# Patient Record
Sex: MALE | Race: WHITE | NOT HISPANIC OR LATINO | RURAL
[De-identification: names, ages, dates, MRNs, and addresses within clinical notes are randomized per-mention and may not be internally consistent; named-entity substitution may affect disease eponyms.]

---

## 2017-06-27 ENCOUNTER — CONVERSION ENCOUNTER (OUTPATIENT)
Dept: FAMILY MEDICINE CLINIC | Facility: CLINIC | Age: 32
End: 2017-06-27

## 2017-06-27 LAB
ALBUMIN SERPL-MCNC: 4.5 G/DL (ref 3.6–5.1)
ALBUMIN/GLOB SERPL: ABNORMAL {RATIO} (ref 1–2.5)
ALP SERPL-CCNC: 119 UNITS/L (ref 40–115)
ALT SERPL-CCNC: 18 UNITS/L (ref 9–46)
AST SERPL-CCNC: 20 UNITS/L (ref 10–40)
BASOPHILS # BLD AUTO: ABNORMAL 10*3/MM3 (ref 0–200)
BASOPHILS NFR BLD AUTO: 0.4 %
BILIRUB SERPL-MCNC: 0.6 MG/DL (ref 0.2–1.2)
BUN SERPL-MCNC: 15 MG/DL (ref 7–25)
BUN/CREAT SERPL: ABNORMAL (ref 6–22)
CALCIUM SERPL-MCNC: 9.9 MG/DL (ref 8.6–10.3)
CHLORIDE SERPL-SCNC: 105 MMOL/L (ref 98–110)
CO2 CONTENT VENOUS: 28 MMOL/L (ref 20–31)
CONV NEUTROPHILS/100 LEUKOCYTES IN BODY FLUID BY MANUAL COUNT: 78.2 %
CONV TOTAL PROTEIN: 7.1 G/DL (ref 6.1–8.1)
CREAT UR-MCNC: 1 MG/DL (ref 0.6–1.35)
EOSINOPHIL # BLD AUTO: 0 %
EOSINOPHIL # BLD AUTO: ABNORMAL 10*3/MM3 (ref 15–500)
ERYTHROCYTE [DISTWIDTH] IN BLOOD BY AUTOMATED COUNT: 13.6 % (ref 11–15)
FERRITIN SERPL-MCNC: 123 NG/ML (ref 20–345)
FOLATE SERPL-MCNC: 7.9 NG/ML
GLOBULIN UR ELPH-MCNC: ABNORMAL G/DL (ref 1.9–3.7)
GLUCOSE SERPL-MCNC: 88 MG/DL (ref 65–99)
HCT VFR BLD AUTO: 49.2 % (ref 38.5–50)
HGB BLD-MCNC: 16.5 G/DL (ref 13.2–17.1)
LYMPHOCYTES # BLD AUTO: ABNORMAL 10*3/MM3 (ref 850–3900)
LYMPHOCYTES NFR BLD AUTO: 15.4 %
MCH RBC QN AUTO: 29.3 PG (ref 27–33)
MCHC RBC AUTO-ENTMCNC: ABNORMAL % (ref 32–36)
MCV RBC AUTO: 87.2 FL (ref 80–100)
MONOCYTES # BLD AUTO: ABNORMAL 10*3/MICROLITER (ref 200–950)
MONOCYTES NFR BLD AUTO: 6 %
NEUTROPHILS # BLD AUTO: ABNORMAL 10*3/MM3 (ref 1500–7800)
PLATELET # BLD AUTO: ABNORMAL 10*3/MM3 (ref 140–400)
PMV BLD AUTO: 11.7 FL (ref 7.5–12.5)
POTASSIUM SERPL-SCNC: 4.4 MMOL/L (ref 3.5–5.3)
RBC # BLD AUTO: ABNORMAL 10*6/MM3 (ref 4.2–5.8)
SODIUM SERPL-SCNC: 140 MMOL/L (ref 135–146)
TSH SERPL-ACNC: 0.68 MICROINTL UNITS/ML (ref 0.4–4.5)
VIT B12 SERPL-MCNC: 412 PG/ML (ref 200–1100)
WBC # BLD AUTO: ABNORMAL K/UL (ref 3.8–10.8)

## 2017-07-11 ENCOUNTER — HOSPITAL ENCOUNTER (OUTPATIENT)
Dept: PHYSICAL THERAPY | Facility: HOSPITAL | Age: 32
Setting detail: RECURRING SERIES
Discharge: HOME | End: 2017-10-10
Attending: FAMILY MEDICINE | Admitting: FAMILY MEDICINE

## 2023-06-29 PROBLEM — M54.9 BACK PAIN: Status: ACTIVE | Noted: 2017-06-22

## 2023-06-29 PROBLEM — E29.1 HYPOGONADISM IN MALE: Status: ACTIVE | Noted: 2017-06-22

## 2023-06-29 PROBLEM — B37.2 CANDIDIASIS OF SKIN: Status: ACTIVE | Noted: 2017-06-22

## 2023-06-29 PROBLEM — R20.0 NUMBNESS: Status: ACTIVE | Noted: 2017-06-22

## 2023-07-03 PROBLEM — R25.1 OCCASIONAL TREMORS: Status: ACTIVE | Noted: 2023-07-03

## 2023-07-03 PROBLEM — R51.9 ACUTE NONINTRACTABLE HEADACHE: Status: ACTIVE | Noted: 2023-07-03

## 2023-07-26 ENCOUNTER — TELEPHONE (OUTPATIENT)
Dept: FAMILY MEDICINE CLINIC | Facility: CLINIC | Age: 38
End: 2023-07-26

## 2023-07-26 NOTE — TELEPHONE ENCOUNTER
Caller: Andrea Goyal    Relationship: Self    Best call back number: [phone]     What test was performed: BLOOD WORK     When was the test performed: 7/20/23    Where was the test performed: [facility]     Additional notes: PATIENT WOULD LIKE FOR SOMEONE TO GIVE HIM A CALL WITH THE RESULTS. PATIENT IS NOT SURE IF HE NEEDS TO COME IN FOR A FOLLOW UP APPOINTMENT

## 2023-07-26 NOTE — TELEPHONE ENCOUNTER
Dharmeshw pt confirmed that he was viewing the results from 6/27/2017. Pt will wait for the results to be release from this year

## 2023-07-26 NOTE — TELEPHONE ENCOUNTER
Called pt informed him that I was unable to view lab results in Epic. Pt states he had them [place] in [place]. Will check with [facility] to see if labs can be pulled. Pt voiced understanding

## 2023-08-10 ENCOUNTER — OFFICE VISIT (OUTPATIENT)
Dept: FAMILY MEDICINE CLINIC | Facility: CLINIC | Age: 38
End: 2023-08-10
Payer: COMMERCIAL

## 2023-08-10 VITALS
SYSTOLIC BLOOD PRESSURE: 138 MMHG | WEIGHT: 256.1 LBS | TEMPERATURE: 97.3 F | BODY MASS INDEX: 29.63 KG/M2 | DIASTOLIC BLOOD PRESSURE: 86 MMHG | OXYGEN SATURATION: 98 % | HEART RATE: 84 BPM | HEIGHT: 78 IN

## 2023-08-10 DIAGNOSIS — N50.819 PAIN IN TESTICLE, UNSPECIFIED LATERALITY: ICD-10-CM

## 2023-08-10 DIAGNOSIS — B76.9 HOOKWORM DISEASE, UNSPECIFIED: Primary | ICD-10-CM

## 2023-08-10 PROCEDURE — 99213 OFFICE O/P EST LOW 20 MIN: CPT | Performed by: FAMILY MEDICINE

## 2023-08-10 RX ORDER — ALBENDAZOLE 200 MG/1
400 TABLET, FILM COATED ORAL DAILY
Qty: 5 TABLET | Refills: 0 | Status: SHIPPED | OUTPATIENT
Start: 2023-08-10 | End: 2023-08-23 | Stop reason: SDUPTHER

## 2023-08-10 NOTE — PROGRESS NOTES
"Chief Complaint  Chief Complaint   Patient presents with    Testicle Pain     Two hard masses one near anus and one on the opposite side     tapeworm     Spouse is being treated for hookworm, tapeworm, and roundworm        Subjective    History of Present Illness        Andrea Goyal presents to [facility] for   History of Present Illness  Patient is a 38-year-old male being seen in clinic for multiple medical problems.  Patient's spouse was found to have either a quarter, taking roundworm he was not sure.  She is currently being treated for the symptoms.  He is concerned that he may have worms because of his wife.  He reports that he like to be treated prophylactically.  Testicle Pain  The patient's primary symptoms include testicular pain. The patient's pertinent negatives include no genital injury, genital itching, pelvic pain, penile discharge or priapism. This is a new problem. The problem has been gradually worsening. The pain is mild. Pertinent negatives include no anorexia, chills, coughing, discolored urine, dysuria, headaches, hematuria, joint swelling, shortness of breath, sore throat, urgency or urinary retention. The color of the testicles is Normal. Nothing aggravates the symptoms. He has tried nothing for the symptoms. The treatment provided no relief. He is sexually active. There is no history of BPH, chlamydia, gonorrhea, an inguinal hernia or prostatitis.      Objective   Vital Signs:   Visit Vitals  /86   Pulse 84   Temp 97.3 øF (36.3 øC) (Temporal)   Ht 200.7 cm (79\")   Wt 116 kg (256 lb 1.6 oz)   SpO2 98%   BMI 28.85 kg/mý             Physical Exam       Result Review :                    Assessment and Plan      Diagnoses and all orders for this visit:    1. Hookworm disease, unspecified (Primary)  Assessment & Plan:  Due to patient's failure of having worms he was prescribed albendazole.  Patient is encouraged return to clinic if symptoms " worsen.    Orders:  -     Discontinue: albendazole (ALBENZA) 200 MG tablet; Take 2 tablets by mouth Daily.  Dispense: 5 tablet; Refill: 0    2. Pain in testicle, unspecified laterality  Assessment & Plan:  Pain has improved.  No mass present on palpation.  If pain returns consider ultrasound.               Follow Up   No follow-ups on file.  Patient was given instructions and counseling regarding his condition or for health maintenance advice. Please see specific information pulled into the AVS if appropriate.

## 2023-08-16 ENCOUNTER — TELEPHONE (OUTPATIENT)
Dept: FAMILY MEDICINE CLINIC | Facility: CLINIC | Age: 38
End: 2023-08-16
Payer: COMMERCIAL

## 2023-08-16 DIAGNOSIS — B76.9 HOOKWORM DISEASE, UNSPECIFIED: ICD-10-CM

## 2023-08-16 NOTE — TELEPHONE ENCOUNTER
Caller: TRENTON RUTH    Relationship: Spouse    Best call back number:[phone]   Who are you requesting to speak with (clinical staff, provider,  specific staff member): DR. FRANCIS    What was the call regarding: HAS QUESTIONS ABOUT THE MEDICATION albendazole (ALBENZA) 200 MG tablet  PHARMACY STATES THEY CAN NOT OPEN ANOTHER BOTTLE FOR THE 5TH PILL AND ASKING IF THE QUANTITY IS RIGHT UNDER IMPRESSION IT WAS SUPPOSE TO BE 10 DAYS WORTH OF PILLS NOT 3 DAYS PLEASE CALL AND ADVISE

## 2023-08-23 RX ORDER — ALBENDAZOLE 200 MG/1
400 TABLET, FILM COATED ORAL DAILY
Qty: 20 TABLET | Refills: 0 | Status: SHIPPED | OUTPATIENT
Start: 2023-08-23 | End: 2023-09-02

## 2023-08-24 PROBLEM — B76.9 HOOKWORM DISEASE, UNSPECIFIED: Status: ACTIVE | Noted: 2023-08-24

## 2023-08-24 PROBLEM — N50.819 PAIN IN TESTICLE: Status: ACTIVE | Noted: 2023-08-24

## 2023-08-24 NOTE — ASSESSMENT & PLAN NOTE
Due to patient's failure of having worms he was prescribed albendazole.  Patient is encouraged return to clinic if symptoms worsen.